# Patient Record
Sex: FEMALE | Race: WHITE | NOT HISPANIC OR LATINO | Employment: UNEMPLOYED | URBAN - NONMETROPOLITAN AREA
[De-identification: names, ages, dates, MRNs, and addresses within clinical notes are randomized per-mention and may not be internally consistent; named-entity substitution may affect disease eponyms.]

---

## 2020-06-01 ENCOUNTER — HOSPITAL ENCOUNTER (EMERGENCY)
Facility: HOSPITAL | Age: 85
Discharge: HOME | End: 2020-06-01
Attending: EMERGENCY MEDICINE | Admitting: EMERGENCY MEDICINE
Payer: MEDICARE

## 2020-06-01 ENCOUNTER — APPOINTMENT (EMERGENCY)
Dept: RADIOLOGY | Facility: HOSPITAL | Age: 85
End: 2020-06-01
Payer: MEDICARE

## 2020-06-01 ENCOUNTER — APPOINTMENT (EMERGENCY)
Dept: CT IMAGING | Facility: HOSPITAL | Age: 85
End: 2020-06-01
Payer: MEDICARE

## 2020-06-01 VITALS
SYSTOLIC BLOOD PRESSURE: 151 MMHG | RESPIRATION RATE: 18 BRPM | TEMPERATURE: 98.7 F | OXYGEN SATURATION: 96 % | WEIGHT: 103.4 LBS | HEART RATE: 92 BPM | DIASTOLIC BLOOD PRESSURE: 87 MMHG

## 2020-06-01 DIAGNOSIS — K83.8 DILATED CBD, ACQUIRED: ICD-10-CM

## 2020-06-01 DIAGNOSIS — R91.1 LUNG NODULE SEEN ON IMAGING STUDY: ICD-10-CM

## 2020-06-01 DIAGNOSIS — I10 ESSENTIAL HYPERTENSION: ICD-10-CM

## 2020-06-01 DIAGNOSIS — R79.89 ELEVATED D-DIMER: ICD-10-CM

## 2020-06-01 DIAGNOSIS — R10.13 EPIGASTRIC ABDOMINAL PAIN: Primary | ICD-10-CM

## 2020-06-01 LAB
ALBUMIN SERPL BCP-MCNC: 3.9 G/DL (ref 3.5–5)
ALP SERPL-CCNC: 77 U/L (ref 46–116)
ALT SERPL W P-5'-P-CCNC: 17 U/L (ref 12–78)
ANION GAP SERPL CALCULATED.3IONS-SCNC: 11 MMOL/L (ref 4–13)
AST SERPL W P-5'-P-CCNC: 22 U/L (ref 5–45)
ATRIAL RATE: 86 BPM
BACTERIA UR QL AUTO: ABNORMAL /HPF
BASOPHILS # BLD AUTO: 0.02 THOUSANDS/ΜL (ref 0–0.1)
BASOPHILS NFR BLD AUTO: 0 % (ref 0–1)
BILIRUB SERPL-MCNC: 0.49 MG/DL (ref 0.2–1)
BILIRUB UR QL STRIP: NEGATIVE
BUN SERPL-MCNC: 15 MG/DL (ref 5–25)
CALCIUM SERPL-MCNC: 9.4 MG/DL (ref 8.3–10.1)
CHLORIDE SERPL-SCNC: 104 MMOL/L (ref 100–108)
CK SERPL-CCNC: 83 U/L (ref 26–192)
CLARITY UR: CLEAR
CO2 SERPL-SCNC: 27 MMOL/L (ref 21–32)
COLOR UR: YELLOW
CREAT SERPL-MCNC: 1.05 MG/DL (ref 0.6–1.3)
D DIMER PPP FEU-MCNC: 1.51 UG/ML FEU
EOSINOPHIL # BLD AUTO: 0.04 THOUSAND/ΜL (ref 0–0.61)
EOSINOPHIL NFR BLD AUTO: 1 % (ref 0–6)
ERYTHROCYTE [DISTWIDTH] IN BLOOD BY AUTOMATED COUNT: 13.1 % (ref 11.6–15.1)
GFR SERPL CREATININE-BSD FRML MDRD: 46 ML/MIN/1.73SQ M
GLUCOSE SERPL-MCNC: 101 MG/DL (ref 65–140)
GLUCOSE UR STRIP-MCNC: NEGATIVE MG/DL
HCT VFR BLD AUTO: 43.6 % (ref 34.8–46.1)
HGB BLD-MCNC: 13.6 G/DL (ref 11.5–15.4)
HGB UR QL STRIP.AUTO: ABNORMAL
HYALINE CASTS #/AREA URNS LPF: ABNORMAL /LPF
IMM GRANULOCYTES # BLD AUTO: 0.04 THOUSAND/UL (ref 0–0.2)
IMM GRANULOCYTES NFR BLD AUTO: 1 % (ref 0–2)
INR PPP: 0.92 (ref 0.84–1.19)
KETONES UR STRIP-MCNC: NEGATIVE MG/DL
LEUKOCYTE ESTERASE UR QL STRIP: NEGATIVE
LIPASE SERPL-CCNC: 147 U/L (ref 73–393)
LYMPHOCYTES # BLD AUTO: 1.14 THOUSANDS/ΜL (ref 0.6–4.47)
LYMPHOCYTES NFR BLD AUTO: 16 % (ref 14–44)
MAGNESIUM SERPL-MCNC: 2 MG/DL (ref 1.6–2.6)
MCH RBC QN AUTO: 31.3 PG (ref 26.8–34.3)
MCHC RBC AUTO-ENTMCNC: 31.2 G/DL (ref 31.4–37.4)
MCV RBC AUTO: 101 FL (ref 82–98)
MONOCYTES # BLD AUTO: 0.49 THOUSAND/ΜL (ref 0.17–1.22)
MONOCYTES NFR BLD AUTO: 7 % (ref 4–12)
NEUTROPHILS # BLD AUTO: 5.59 THOUSANDS/ΜL (ref 1.85–7.62)
NEUTS SEG NFR BLD AUTO: 75 % (ref 43–75)
NITRITE UR QL STRIP: NEGATIVE
NON-SQ EPI CELLS URNS QL MICRO: ABNORMAL /HPF
NRBC BLD AUTO-RTO: 0 /100 WBCS
P AXIS: 87 DEGREES
PH UR STRIP.AUTO: 6 [PH]
PLATELET # BLD AUTO: 174 THOUSANDS/UL (ref 149–390)
PMV BLD AUTO: 10.1 FL (ref 8.9–12.7)
POTASSIUM SERPL-SCNC: 4.3 MMOL/L (ref 3.5–5.3)
PR INTERVAL: 150 MS
PROT SERPL-MCNC: 8.3 G/DL (ref 6.4–8.2)
PROT UR STRIP-MCNC: NEGATIVE MG/DL
PROTHROMBIN TIME: 12.4 SECONDS (ref 11.6–14.5)
QRS AXIS: 82 DEGREES
QRSD INTERVAL: 66 MS
QT INTERVAL: 372 MS
QTC INTERVAL: 445 MS
RBC # BLD AUTO: 4.34 MILLION/UL (ref 3.81–5.12)
RBC #/AREA URNS AUTO: ABNORMAL /HPF
SODIUM SERPL-SCNC: 142 MMOL/L (ref 136–145)
SP GR UR STRIP.AUTO: 1.01 (ref 1–1.03)
T WAVE AXIS: 74 DEGREES
TROPONIN I SERPL-MCNC: <0.02 NG/ML
TROPONIN I SERPL-MCNC: <0.02 NG/ML
UROBILINOGEN UR QL STRIP.AUTO: 0.2 E.U./DL
VENTRICULAR RATE: 86 BPM
WBC # BLD AUTO: 7.32 THOUSAND/UL (ref 4.31–10.16)
WBC #/AREA URNS AUTO: ABNORMAL /HPF

## 2020-06-01 PROCEDURE — 96374 THER/PROPH/DIAG INJ IV PUSH: CPT

## 2020-06-01 PROCEDURE — 74175 CTA ABDOMEN W/CONTRAST: CPT

## 2020-06-01 PROCEDURE — 96361 HYDRATE IV INFUSION ADD-ON: CPT

## 2020-06-01 PROCEDURE — 93005 ELECTROCARDIOGRAM TRACING: CPT

## 2020-06-01 PROCEDURE — 80053 COMPREHEN METABOLIC PANEL: CPT | Performed by: EMERGENCY MEDICINE

## 2020-06-01 PROCEDURE — 96376 TX/PRO/DX INJ SAME DRUG ADON: CPT

## 2020-06-01 PROCEDURE — 85379 FIBRIN DEGRADATION QUANT: CPT | Performed by: EMERGENCY MEDICINE

## 2020-06-01 PROCEDURE — 96375 TX/PRO/DX INJ NEW DRUG ADDON: CPT

## 2020-06-01 PROCEDURE — 71275 CT ANGIOGRAPHY CHEST: CPT

## 2020-06-01 PROCEDURE — 83690 ASSAY OF LIPASE: CPT | Performed by: EMERGENCY MEDICINE

## 2020-06-01 PROCEDURE — 70450 CT HEAD/BRAIN W/O DYE: CPT

## 2020-06-01 PROCEDURE — 83735 ASSAY OF MAGNESIUM: CPT | Performed by: EMERGENCY MEDICINE

## 2020-06-01 PROCEDURE — 82550 ASSAY OF CK (CPK): CPT | Performed by: EMERGENCY MEDICINE

## 2020-06-01 PROCEDURE — 36415 COLL VENOUS BLD VENIPUNCTURE: CPT | Performed by: EMERGENCY MEDICINE

## 2020-06-01 PROCEDURE — 84484 ASSAY OF TROPONIN QUANT: CPT | Performed by: EMERGENCY MEDICINE

## 2020-06-01 PROCEDURE — 81001 URINALYSIS AUTO W/SCOPE: CPT | Performed by: EMERGENCY MEDICINE

## 2020-06-01 PROCEDURE — 85610 PROTHROMBIN TIME: CPT | Performed by: EMERGENCY MEDICINE

## 2020-06-01 PROCEDURE — 99284 EMERGENCY DEPT VISIT MOD MDM: CPT

## 2020-06-01 PROCEDURE — 71045 X-RAY EXAM CHEST 1 VIEW: CPT

## 2020-06-01 PROCEDURE — 99285 EMERGENCY DEPT VISIT HI MDM: CPT | Performed by: EMERGENCY MEDICINE

## 2020-06-01 PROCEDURE — 85025 COMPLETE CBC W/AUTO DIFF WBC: CPT | Performed by: EMERGENCY MEDICINE

## 2020-06-01 RX ORDER — DICYCLOMINE HCL 20 MG
20 TABLET ORAL 2 TIMES DAILY
Qty: 20 TABLET | Refills: 0 | Status: SHIPPED | OUTPATIENT
Start: 2020-06-01

## 2020-06-01 RX ORDER — LABETALOL 20 MG/4 ML (5 MG/ML) INTRAVENOUS SYRINGE
10 ONCE
Status: COMPLETED | OUTPATIENT
Start: 2020-06-01 | End: 2020-06-01

## 2020-06-01 RX ORDER — SODIUM CHLORIDE 9 MG/ML
3 INJECTION INTRAVENOUS
Status: DISCONTINUED | OUTPATIENT
Start: 2020-06-01 | End: 2020-06-01 | Stop reason: HOSPADM

## 2020-06-01 RX ORDER — HYDRALAZINE HYDROCHLORIDE 20 MG/ML
10 INJECTION INTRAMUSCULAR; INTRAVENOUS ONCE
Status: COMPLETED | OUTPATIENT
Start: 2020-06-01 | End: 2020-06-01

## 2020-06-01 RX ORDER — FAMOTIDINE 20 MG/1
20 TABLET, FILM COATED ORAL 2 TIMES DAILY
Qty: 30 TABLET | Refills: 0 | Status: SHIPPED | OUTPATIENT
Start: 2020-06-01

## 2020-06-01 RX ADMIN — HYDRALAZINE HYDROCHLORIDE 10 MG: 20 INJECTION INTRAMUSCULAR; INTRAVENOUS at 16:18

## 2020-06-01 RX ADMIN — SODIUM CHLORIDE 500 ML: 0.9 INJECTION, SOLUTION INTRAVENOUS at 13:30

## 2020-06-01 RX ADMIN — FAMOTIDINE 20 MG: 10 INJECTION, SOLUTION INTRAVENOUS at 13:31

## 2020-06-01 RX ADMIN — LABETALOL 20 MG/4 ML (5 MG/ML) INTRAVENOUS SYRINGE 10 MG: at 13:31

## 2020-06-01 RX ADMIN — LABETALOL 20 MG/4 ML (5 MG/ML) INTRAVENOUS SYRINGE 10 MG: at 14:57

## 2020-06-01 RX ADMIN — MORPHINE SULFATE 1 MG: 2 INJECTION, SOLUTION INTRAMUSCULAR; INTRAVENOUS at 13:30

## 2020-06-01 RX ADMIN — IOHEXOL 100 ML: 350 INJECTION, SOLUTION INTRAVENOUS at 14:52

## 2020-06-04 ENCOUNTER — HOSPITAL ENCOUNTER (EMERGENCY)
Facility: HOSPITAL | Age: 85
Discharge: HOME | End: 2020-06-04
Attending: EMERGENCY MEDICINE | Admitting: EMERGENCY MEDICINE
Payer: MEDICARE

## 2020-06-04 ENCOUNTER — APPOINTMENT (EMERGENCY)
Dept: ULTRASOUND IMAGING | Facility: HOSPITAL | Age: 85
End: 2020-06-04
Payer: MEDICARE

## 2020-06-04 VITALS
RESPIRATION RATE: 20 BRPM | HEART RATE: 75 BPM | TEMPERATURE: 98.4 F | WEIGHT: 105.82 LBS | SYSTOLIC BLOOD PRESSURE: 132 MMHG | DIASTOLIC BLOOD PRESSURE: 63 MMHG | OXYGEN SATURATION: 95 %

## 2020-06-04 DIAGNOSIS — M54.9 BACK PAIN: Primary | ICD-10-CM

## 2020-06-04 DIAGNOSIS — R10.13 EPIGASTRIC PAIN: ICD-10-CM

## 2020-06-04 LAB
ALBUMIN SERPL BCP-MCNC: 3.8 G/DL (ref 3.5–5)
ALP SERPL-CCNC: 73 U/L (ref 46–116)
ALT SERPL W P-5'-P-CCNC: 22 U/L (ref 12–78)
ANION GAP SERPL CALCULATED.3IONS-SCNC: 7 MMOL/L (ref 4–13)
AST SERPL W P-5'-P-CCNC: 29 U/L (ref 5–45)
ATRIAL RATE: 85 BPM
BASOPHILS # BLD AUTO: 0.02 THOUSANDS/ΜL (ref 0–0.1)
BASOPHILS NFR BLD AUTO: 0 % (ref 0–1)
BILIRUB SERPL-MCNC: 0.58 MG/DL (ref 0.2–1)
BUN SERPL-MCNC: 14 MG/DL (ref 5–25)
CALCIUM SERPL-MCNC: 8.9 MG/DL (ref 8.3–10.1)
CHLORIDE SERPL-SCNC: 105 MMOL/L (ref 100–108)
CO2 SERPL-SCNC: 31 MMOL/L (ref 21–32)
CREAT SERPL-MCNC: 1 MG/DL (ref 0.6–1.3)
EOSINOPHIL # BLD AUTO: 0.1 THOUSAND/ΜL (ref 0–0.61)
EOSINOPHIL NFR BLD AUTO: 1 % (ref 0–6)
ERYTHROCYTE [DISTWIDTH] IN BLOOD BY AUTOMATED COUNT: 13.2 % (ref 11.6–15.1)
GFR SERPL CREATININE-BSD FRML MDRD: 49 ML/MIN/1.73SQ M
GLUCOSE SERPL-MCNC: 102 MG/DL (ref 65–140)
HCT VFR BLD AUTO: 44.1 % (ref 34.8–46.1)
HGB BLD-MCNC: 13.8 G/DL (ref 11.5–15.4)
IMM GRANULOCYTES # BLD AUTO: 0.04 THOUSAND/UL (ref 0–0.2)
IMM GRANULOCYTES NFR BLD AUTO: 1 % (ref 0–2)
LACTATE SERPL-SCNC: 1.4 MMOL/L (ref 0.5–2)
LIPASE SERPL-CCNC: 185 U/L (ref 73–393)
LYMPHOCYTES # BLD AUTO: 0.99 THOUSANDS/ΜL (ref 0.6–4.47)
LYMPHOCYTES NFR BLD AUTO: 14 % (ref 14–44)
MCH RBC QN AUTO: 31.2 PG (ref 26.8–34.3)
MCHC RBC AUTO-ENTMCNC: 31.3 G/DL (ref 31.4–37.4)
MCV RBC AUTO: 100 FL (ref 82–98)
MONOCYTES # BLD AUTO: 0.57 THOUSAND/ΜL (ref 0.17–1.22)
MONOCYTES NFR BLD AUTO: 8 % (ref 4–12)
NEUTROPHILS # BLD AUTO: 5.34 THOUSANDS/ΜL (ref 1.85–7.62)
NEUTS SEG NFR BLD AUTO: 76 % (ref 43–75)
NRBC BLD AUTO-RTO: 0 /100 WBCS
P AXIS: 95 DEGREES
PLATELET # BLD AUTO: 183 THOUSANDS/UL (ref 149–390)
PMV BLD AUTO: 10.2 FL (ref 8.9–12.7)
POTASSIUM SERPL-SCNC: 4.1 MMOL/L (ref 3.5–5.3)
PR INTERVAL: 148 MS
PROT SERPL-MCNC: 8.4 G/DL (ref 6.4–8.2)
QRS AXIS: 94 DEGREES
QRSD INTERVAL: 60 MS
QT INTERVAL: 364 MS
QTC INTERVAL: 433 MS
RBC # BLD AUTO: 4.42 MILLION/UL (ref 3.81–5.12)
SODIUM SERPL-SCNC: 143 MMOL/L (ref 136–145)
T WAVE AXIS: 85 DEGREES
TROPONIN I SERPL-MCNC: <0.02 NG/ML
VENTRICULAR RATE: 85 BPM
WBC # BLD AUTO: 7.06 THOUSAND/UL (ref 4.31–10.16)

## 2020-06-04 PROCEDURE — 99285 EMERGENCY DEPT VISIT HI MDM: CPT | Performed by: PHYSICIAN ASSISTANT

## 2020-06-04 PROCEDURE — 36415 COLL VENOUS BLD VENIPUNCTURE: CPT | Performed by: PHYSICIAN ASSISTANT

## 2020-06-04 PROCEDURE — 96375 TX/PRO/DX INJ NEW DRUG ADDON: CPT

## 2020-06-04 PROCEDURE — 96374 THER/PROPH/DIAG INJ IV PUSH: CPT

## 2020-06-04 PROCEDURE — 93005 ELECTROCARDIOGRAM TRACING: CPT

## 2020-06-04 PROCEDURE — 76705 ECHO EXAM OF ABDOMEN: CPT

## 2020-06-04 PROCEDURE — 85025 COMPLETE CBC W/AUTO DIFF WBC: CPT | Performed by: PHYSICIAN ASSISTANT

## 2020-06-04 PROCEDURE — 83690 ASSAY OF LIPASE: CPT | Performed by: PHYSICIAN ASSISTANT

## 2020-06-04 PROCEDURE — 84484 ASSAY OF TROPONIN QUANT: CPT | Performed by: PHYSICIAN ASSISTANT

## 2020-06-04 PROCEDURE — 83605 ASSAY OF LACTIC ACID: CPT | Performed by: PHYSICIAN ASSISTANT

## 2020-06-04 PROCEDURE — 99284 EMERGENCY DEPT VISIT MOD MDM: CPT

## 2020-06-04 PROCEDURE — 80053 COMPREHEN METABOLIC PANEL: CPT | Performed by: PHYSICIAN ASSISTANT

## 2020-06-04 RX ORDER — LABETALOL 20 MG/4 ML (5 MG/ML) INTRAVENOUS SYRINGE
10 ONCE
Status: COMPLETED | OUTPATIENT
Start: 2020-06-04 | End: 2020-06-04

## 2020-06-04 RX ORDER — CLONIDINE HYDROCHLORIDE 0.1 MG/1
0.2 TABLET ORAL ONCE
Status: COMPLETED | OUTPATIENT
Start: 2020-06-04 | End: 2020-06-04

## 2020-06-04 RX ADMIN — CLONIDINE HYDROCHLORIDE 0.2 MG: 0.1 TABLET ORAL at 10:49

## 2020-06-04 RX ADMIN — FAMOTIDINE 20 MG: 10 INJECTION, SOLUTION INTRAVENOUS at 11:31

## 2020-06-04 RX ADMIN — LABETALOL 20 MG/4 ML (5 MG/ML) INTRAVENOUS SYRINGE 10 MG: at 10:50

## 2022-08-24 ENCOUNTER — OFFICE VISIT (OUTPATIENT)
Dept: URGENT CARE | Facility: CLINIC | Age: 87
End: 2022-08-24
Payer: MEDICARE

## 2022-08-24 ENCOUNTER — APPOINTMENT (OUTPATIENT)
Dept: RADIOLOGY | Facility: CLINIC | Age: 87
End: 2022-08-24
Payer: MEDICARE

## 2022-08-24 VITALS
WEIGHT: 91 LBS | RESPIRATION RATE: 18 BRPM | DIASTOLIC BLOOD PRESSURE: 89 MMHG | TEMPERATURE: 98 F | BODY MASS INDEX: 15.54 KG/M2 | HEART RATE: 83 BPM | HEIGHT: 64 IN | SYSTOLIC BLOOD PRESSURE: 142 MMHG | OXYGEN SATURATION: 94 %

## 2022-08-24 DIAGNOSIS — S20.212A RIB CONTUSION, LEFT, INITIAL ENCOUNTER: ICD-10-CM

## 2022-08-24 DIAGNOSIS — S22.32XA CLOSED FRACTURE OF ONE RIB OF LEFT SIDE, INITIAL ENCOUNTER: Primary | ICD-10-CM

## 2022-08-24 PROBLEM — F41.9 ANXIETY: Status: ACTIVE | Noted: 2022-08-24

## 2022-08-24 PROBLEM — M25.50 POLYARTHRALGIA: Status: ACTIVE | Noted: 2021-06-15

## 2022-08-24 PROBLEM — I10 PRIMARY HYPERTENSION: Status: ACTIVE | Noted: 2021-11-02

## 2022-08-24 PROCEDURE — 99203 OFFICE O/P NEW LOW 30 MIN: CPT | Performed by: PHYSICIAN ASSISTANT

## 2022-08-24 PROCEDURE — G0463 HOSPITAL OUTPT CLINIC VISIT: HCPCS | Performed by: PHYSICIAN ASSISTANT

## 2022-08-24 PROCEDURE — 71101 X-RAY EXAM UNILAT RIBS/CHEST: CPT

## 2022-08-24 RX ORDER — ASPIRIN 81 MG/1
81 TABLET ORAL
COMMUNITY

## 2022-08-24 RX ORDER — SERTRALINE HYDROCHLORIDE 25 MG/1
TABLET, FILM COATED ORAL
COMMUNITY
Start: 2022-08-02

## 2022-08-24 NOTE — PATIENT INSTRUCTIONS
Head Injury   WHAT YOU NEED TO KNOW:   A head injury can include your scalp, face, skull, or brain and range from mild to severe  Effects can appear immediately after the injury or develop later  The effects may last a short time or be permanent  Healthcare providers may want to check your recovery over time  Treatment may change as you recover or develop new health problems from the head injury  DISCHARGE INSTRUCTIONS:   Call your local emergency number (911 in the [place]), or have someone else call if:   You cannot be woken  You have a seizure  You stop responding to others or you faint  You have blurry or double vision  Your speech becomes slurred or confused  You have arm or leg weakness, loss of feeling, or new problems with coordination  Your pupils are larger than usual, or one pupil is a different size than the other  You have blood or clear fluid coming out of your ears or nose  Return to the emergency department if:   You have repeated or forceful vomiting  You feel confused  Your headache gets worse or becomes severe  You or someone caring for you notices that you are harder to wake than usual     Call your doctor if:   Your symptoms last longer than 6 weeks after the injury  You have questions or concerns about your condition or care  Medicines:   Acetaminophen  decreases pain and fever  It is available without a doctor's order  Ask how much to take and how often to take it  Follow directions  Read the labels of all other medicines you are using to see if they also contain acetaminophen, or ask your doctor or pharmacist  Acetaminophen can cause liver damage if not taken correctly  Do not use more than 4 grams (4,000 milligrams) total of acetaminophen in one day  Take your medicine as directed  Contact your healthcare provider if you think your medicine is not helping or if you have side effects  Tell him or her if you are allergic to any medicine   Keep a list of the medicines, vitamins, and herbs you take  Include the amounts, and when and why you take them  Bring the list or the pill bottles to follow-up visits  Carry your medicine list with you in case of an emergency  Self-care:   Rest  or do quiet activities  Limit your time watching TV, using the computer, or doing tasks that require a lot of thinking  Slowly return to your normal activities as directed  Do not play sports or do activities that may cause you to get hit in the head  Ask your healthcare provider when you can return to sports  Apply ice  on your head for 15 to 20 minutes every hour or as directed  Use an ice pack, or put crushed ice in a plastic bag  Cover it with a towel before you apply it to your skin  Ice helps prevent tissue damage and decreases swelling and pain  Have someone stay with you for 24 hours  , or as directed  This person can monitor you for problems and call for help if needed  When you are awake, the person should ask you a few questions every few hours to see if you are thinking clearly  An example is to ask your name or address  Prevent another head injury:   Wear a helmet that fits properly  Do this when you play sports, or ride a bike, scooter, or skateboard  Helmets help decrease your risk for a serious head injury  Talk to your healthcare provider about other ways you can protect yourself if you play sports  Wear your seatbelt every time you are in a car  This helps lower your risk for a head injury if you are in a car accident  Follow up with your doctor as directed:  Write down your questions so you remember to ask them during your visits  © Copyright [employer] 2022 Information is for End User's use only and may not be sold, redistributed or otherwise used for commercial purposes  All illustrations and images included in CareNotes® are the copyrighted property of A [employer] M [employer] Inc  or Monica Fraser  The above information is an  only   It is not intended as medical advice for individual conditions or treatments  Talk to your doctor, nurse or pharmacist before following any medical regimen to see if it is safe and effective for you  Rib Fracture   WHAT YOU NEED TO KNOW:   A rib fracture is a crack or break in a rib bone  Rib fractures usually heal within 6 weeks  You should be able to return to your usual activities before that time  DISCHARGE INSTRUCTIONS:   Call your local emergency number (911 in the [address] [facility]) if:   You have trouble breathing  You have new or increased pain  Return to the emergency department if:   Your pain does not get better, even after treatment  You have a fever or a cough  Call your doctor if:   You have questions or concerns about your condition or care  Medicines: You may need any of the following:  NSAIDs , such as ibuprofen, help decrease swelling, pain, and fever  This medicine is available with or without a doctor's order  NSAIDs can cause stomach bleeding or kidney problems in certain people  If you take blood thinner medicine, always ask your healthcare provider if NSAIDs are safe for you  Always read the medicine label and follow directions  Prescription pain medicine  may be given  Ask your healthcare provider how to take this medicine safely  Some prescription pain medicines contain acetaminophen  Do not take other medicines that contain acetaminophen without talking to your healthcare provider  Too much acetaminophen may cause liver damage  Prescription pain medicine may cause constipation  Ask your healthcare provider how to prevent or treat constipation  Take your medicine as directed  Contact your healthcare provider if you think your medicine is not helping or if you have side effects  Tell him or her if you are allergic to any medicine  Keep a list of the medicines, vitamins, and herbs you take  Include the amounts, and when and why you take them   Bring the list or the pill bottles to follow-up visits  Carry your medicine list with you in case of an emergency  Self-care: Take deep breaths and cough 10 times each hour  This will decrease your risk for a lung infection  Hug a pillow on your injured side to decrease pain while you take deep breaths  Take a deep breath and hold it for as long as you can  Let the air out and then cough  Deep breaths help open your airway  You may be given an incentive spirometer to help you take deep breaths  Put the plastic piece in your mouth and take a slow, deep breath, then let the air out and cough  Repeat these steps 10 times every hour  Rest and limit activity as directed  Do not pull, push, or lift objects  Start to do more as your pain decreases  Ask your healthcare provider how much activity you can do  Apply ice  on your chest near your fractured rib for 15 to 20 minutes every hour or as directed  Use an ice pack, or put crushed ice in a plastic bag  Cover it with a towel  Ice helps prevent tissue damage and decreases swelling and pain  Follow up with your doctor as directed:  Write down your questions so you remember to ask them during your visits  © Copyright [employer] 2022 Information is for End User's use only and may not be sold, redistributed or otherwise used for commercial purposes  All illustrations and images included in CareNotes® are the copyrighted property of [employer]  or Monica Fraser  The above information is an  only  It is not intended as medical advice for individual conditions or treatments  Talk to your doctor, nurse or pharmacist before following any medical regimen to see if it is safe and effective for you

## 2022-08-24 NOTE — PROGRESS NOTES
[facility]        NAME: Nandini Bazan is a 80 y o  female  : 3/31/1928    MRN: [id number]  DATE: 2022  TIME: 12:25 PM    Assessment and Plan   Closed fracture of one rib of left side, initial encounter [S22 32XA]  1  Closed fracture of one rib of left side, initial encounter     2  Rib contusion, left, initial encounter  XR ribs left w pa chest min 3 views         Patient Instructions   Patient Instructions     Head Injury   WHAT YOU NEED TO KNOW:   A head injury can include your scalp, face, skull, or brain and range from mild to severe  Effects can appear immediately after the injury or develop later  The effects may last a short time or be permanent  Healthcare providers may want to check your recovery over time  Treatment may change as you recover or develop new health problems from the head injury  DISCHARGE INSTRUCTIONS:   Call your local emergency number (911 in the [address] [place] [address],[address]), or have someone else call if:   · You cannot be woken  · You have a seizure  · You stop responding to others or you faint  · You have blurry or double vision  · Your speech becomes slurred or confused  · You have arm or leg weakness, loss of feeling, or new problems with coordination  · Your pupils are larger than usual, or one pupil is a different size than the other  · You have blood or clear fluid coming out of your ears or nose  Return to the emergency department if:   · You have repeated or forceful vomiting  · You feel confused  · Your headache gets worse or becomes severe  · You or someone caring for you notices that you are harder to wake than usual     Call your doctor if:   · Your symptoms last longer than 6 weeks after the injury  · You have questions or concerns about your condition or care  Medicines:   · Acetaminophen  decreases pain and fever  It is available without a doctor's order  Ask how much to take and how often to take it  Follow directions   Read the labels of all other medicines you are using to see if they also contain acetaminophen, or ask your doctor or pharmacist  Acetaminophen can cause liver damage if not taken correctly  Do not use more than 4 grams (4,000 milligrams) total of acetaminophen in one day  · Take your medicine as directed  Contact your healthcare provider if you think your medicine is not helping or if you have side effects  Tell him or her if you are allergic to any medicine  Keep a list of the medicines, vitamins, and herbs you take  Include the amounts, and when and why you take them  Bring the list or the pill bottles to follow-up visits  Carry your medicine list with you in case of an emergency  Self-care:   · Rest  or do quiet activities  Limit your time watching TV, using the computer, or doing tasks that require a lot of thinking  Slowly return to your normal activities as directed  Do not play sports or do activities that may cause you to get hit in the head  Ask your healthcare provider when you can return to sports  · Apply ice  on your head for 15 to 20 minutes every hour or as directed  Use an ice pack, or put crushed ice in a plastic bag  Cover it with a towel before you apply it to your skin  Ice helps prevent tissue damage and decreases swelling and pain  · Have someone stay with you for 24 hours  , or as directed  This person can monitor you for problems and call for help if needed  When you are awake, the person should ask you a few questions every few hours to see if you are thinking clearly  An example is to ask your name or address  Prevent another head injury:   · Wear a helmet that fits properly  Do this when you play sports, or ride a bike, scooter, or skateboard  Helmets help decrease your risk for a serious head injury  Talk to your healthcare provider about other ways you can protect yourself if you play sports  · Wear your seatbelt every time you are in a car    This helps lower your risk for a head injury if you are in a car accident  Follow up with your doctor as directed:  Write down your questions so you remember to ask them during your visits  © Copyright [employer] 2022 Information is for End User's use only and may not be sold, redistributed or otherwise used for commercial purposes  All illustrations and images included in CareNotes® are the copyrighted property of A [employer] M [employer] Inc  or Monica Tsai   The above information is an  only  It is not intended as medical advice for individual conditions or treatments  Talk to your doctor, nurse or pharmacist before following any medical regimen to see if it is safe and effective for you  Rib Fracture   WHAT YOU NEED TO KNOW:   A rib fracture is a crack or break in a rib bone  Rib fractures usually heal within 6 weeks  You should be able to return to your usual activities before that time  DISCHARGE INSTRUCTIONS:   Call your local emergency number (911 in the [address] [facility]) if:   · You have trouble breathing  · You have new or increased pain  Return to the emergency department if:   · Your pain does not get better, even after treatment  · You have a fever or a cough  Call your doctor if:   · You have questions or concerns about your condition or care  Medicines: You may need any of the following:  · NSAIDs , such as ibuprofen, help decrease swelling, pain, and fever  This medicine is available with or without a doctor's order  NSAIDs can cause stomach bleeding or kidney problems in certain people  If you take blood thinner medicine, always ask your healthcare provider if NSAIDs are safe for you  Always read the medicine label and follow directions  · Prescription pain medicine  may be given  Ask your healthcare provider how to take this medicine safely  Some prescription pain medicines contain acetaminophen  Do not take other medicines that contain acetaminophen without talking to your healthcare provider   Too much acetaminophen may cause liver damage  Prescription pain medicine may cause constipation  Ask your healthcare provider how to prevent or treat constipation  · Take your medicine as directed  Contact your healthcare provider if you think your medicine is not helping or if you have side effects  Tell him or her if you are allergic to any medicine  Keep a list of the medicines, vitamins, and herbs you take  Include the amounts, and when and why you take them  Bring the list or the pill bottles to follow-up visits  Carry your medicine list with you in case of an emergency  Self-care:   · Take deep breaths and cough 10 times each hour  This will decrease your risk for a lung infection  Hug a pillow on your injured side to decrease pain while you take deep breaths  Take a deep breath and hold it for as long as you can  Let the air out and then cough  Deep breaths help open your airway  You may be given an incentive spirometer to help you take deep breaths  Put the plastic piece in your mouth and take a slow, deep breath, then let the air out and cough  Repeat these steps 10 times every hour  · Rest and limit activity as directed  Do not pull, push, or lift objects  Start to do more as your pain decreases  Ask your healthcare provider how much activity you can do  · Apply ice  on your chest near your fractured rib for 15 to 20 minutes every hour or as directed  Use an ice pack, or put crushed ice in a plastic bag  Cover it with a towel  Ice helps prevent tissue damage and decreases swelling and pain  Follow up with your doctor as directed:  Write down your questions so you remember to ask them during your visits  © Copyright [employer] 2022 Information is for End User's use only and may not be sold, redistributed or otherwise used for commercial purposes   All illustrations and images included in CareNotes® are the copyrighted property of A [employer]  or Monica Fraser  The above information is an  only  It is not intended as medical advice for individual conditions or treatments  Talk to your doctor, nurse or pharmacist before following any medical regimen to see if it is safe and effective for you  Follow up with PCP in 3-5 days  Proceed to  ER if symptoms worsen  Chief Complaint     Chief Complaint   Patient presents with    Fall    Rib Injury         History of Present Illness       Patient is a 51-year-old female who presents to the clinic with her daughter's after a fall this morning at home  The patient lives at home by her self and was brought to the clinic by her daughters who states that she call them home today stating that she fell when she tried to get on her potty chair this morning  The patient states that she fell up against the arms of the potty chair and fell against her dresser that was near her bed  She states that a glass of water and medicine fell off the dresser on to the right side of her head  The patient may have also bumped the floor with her head but denied any loss of consciousness  The patient is complaining of a contusion on the left lateral ribs  The patient describes the pain as a sharp pain that is worse with movement, heavy breathing, and touching the area  The patient denies associated headache, dizziness, nausea, vomiting, fever, or chills parents she does have a history of slight dementia  The patient is not currently on any anticoagulants therapy but is on a baby aspirin daily  Review of Systems   Review of Systems   Constitutional: Negative for chills, diaphoresis and fatigue  Respiratory: Negative for choking and shortness of breath  Cardiovascular: Negative for chest pain and leg swelling  Gastrointestinal: Negative for abdominal distention and abdominal pain  Musculoskeletal: Negative for arthralgias     Skin:        Abrasion of the lateral ribs as noted above   Neurological: Negative for dizziness, tremors, seizures, syncope, speech difficulty, weakness, numbness and headaches  Current Medications       Current Outpatient Medications:     aspirin (ECOTRIN LOW STRENGTH) 81 mg EC tablet, Take 81 mg by mouth, Disp: , Rfl:     dicyclomine (BENTYL) 20 mg tablet, Take 1 tablet (20 mg total) by mouth 2 (two) times a day, Disp: 20 tablet, Rfl: 0    famotidine (PEPCID) 20 mg tablet, Take 1 tablet (20 mg total) by mouth 2 (two) times a day, Disp: 30 tablet, Rfl: 0    sertraline (ZOLOFT) 25 mg tablet, , Disp: , Rfl:     Current Allergies     Allergies as of 08/24/2022 - Reviewed 08/24/2022   Allergen Reaction Noted    Prednisone Other (See Comments) 06/01/2020    Azithromycin Rash 06/01/2020            The following portions of the patient's history were reviewed and updated as appropriate: allergies, current medications, past family history, past medical history, past social history, past surgical history and problem list      Past Medical History:   Diagnosis Date    Anxiety     Arthritis     Biceps tendinitis     right shoulder    Diverticulosis     GERD (gastroesophageal reflux disease)     Hypertension     IBS (irritable bowel syndrome)     Osteoporosis     Venous insufficiency        Past Surgical History:   Procedure Laterality Date    APPENDECTOMY      ARTHROSCOPY KNEE Right     CATARACT EXTRACTION, BILATERAL      FRACTURE SURGERY Left     wrist    FRACTURE SURGERY Left     hip    HYSTERECTOMY         History reviewed  No pertinent family history  Medications have been verified  Objective   /89   Pulse 83   Temp 98 °F (36 7 °C)   Resp 18   Ht 5' 4" (1 626 m)   Wt 41 3 kg (91 lb)   LMP  (LMP Unknown)   SpO2 94%   BMI 15 62 kg/m²        Physical Exam     Physical Exam  HENT:      Right Ear: No middle ear effusion  No hemotympanum  Tympanic membrane is not injected  Left Ear:  No middle ear effusion  No hemotympanum  Tympanic membrane is not injected  Chest:      Chest wall: Tenderness and edema present  Comments: -there are multiple contusions noted on the left lateral ribs  There is tenderness noted on the 8th, 9th,, 10th, 11th, and 12th anterior and lateral ribs  Neurological:      Mental Status: She is alert  -x-ray reviewed  There appears to be a displaced 5th rib fracture  She does have a follow-up with her primary care doctor today at 4:00 p m  Her daughter will discuss pain relief with the primary care doctor  I did warn of using pain meds in elderly as it increases the risk of fall  I provided a incentive spirometer and instructed the patient to use this daily to prevent infection  She has no focal neurological deficit noted on exam to indicate ER at this time, however she will need imaging of her brain if she has any change in neurological status

## 2024-09-21 ENCOUNTER — HOSPITAL ENCOUNTER (EMERGENCY)
Facility: HOSPITAL | Age: 89
Discharge: HOME | End: 2024-09-21
Payer: MEDICARE

## 2024-09-21 ENCOUNTER — APPOINTMENT (EMERGENCY)
Dept: CT IMAGING | Facility: HOSPITAL | Age: 89
End: 2024-09-21
Payer: MEDICARE

## 2024-09-21 ENCOUNTER — APPOINTMENT (EMERGENCY)
Dept: RADIOLOGY | Facility: HOSPITAL | Age: 89
End: 2024-09-21
Payer: MEDICARE

## 2024-09-21 VITALS
SYSTOLIC BLOOD PRESSURE: 159 MMHG | HEART RATE: 70 BPM | RESPIRATION RATE: 16 BRPM | TEMPERATURE: 97.4 F | DIASTOLIC BLOOD PRESSURE: 79 MMHG | OXYGEN SATURATION: 95 %
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
## 2024-09-21 DIAGNOSIS — T14.8XXA ABRASION: ICD-10-CM

## 2024-09-21 DIAGNOSIS — S09.90XA INJURY OF HEAD, INITIAL ENCOUNTER: ICD-10-CM

## 2024-09-21 LAB
2HR DELTA HS TROPONIN: 8 NG/L
4HR DELTA HS TROPONIN: 11 NG/L
ANION GAP SERPL CALCULATED.3IONS-SCNC: 4 MMOL/L (ref 4–13)
BASOPHILS # BLD AUTO: 0.03 THOUSANDS/ΜL (ref 0–0.1)
BASOPHILS NFR BLD AUTO: 0 % (ref 0–1)
BUN SERPL-MCNC: 19 MG/DL (ref 5–25)
CALCIUM SERPL-MCNC: 10.6 MG/DL (ref 8.4–10.2)
CARDIAC TROPONIN I PNL SERPL HS: 16 NG/L
CARDIAC TROPONIN I PNL SERPL HS: 24 NG/L
CARDIAC TROPONIN I PNL SERPL HS: 27 NG/L
CHLORIDE SERPL-SCNC: 103 MMOL/L (ref 96–108)
CO2 SERPL-SCNC: 32 MMOL/L (ref 21–32)
CREAT SERPL-MCNC: 1.14 MG/DL (ref 0.6–1.3)
EOSINOPHIL # BLD AUTO: 0.06 THOUSAND/ΜL (ref 0–0.61)
EOSINOPHIL NFR BLD AUTO: 1 % (ref 0–6)
ERYTHROCYTE [DISTWIDTH] IN BLOOD BY AUTOMATED COUNT: 13.4 % (ref 11.6–15.1)
GFR SERPL CREATININE-BSD FRML MDRD: 40 ML/MIN/1.73SQ M
GLUCOSE SERPL-MCNC: 78 MG/DL (ref 65–140)
HCT VFR BLD AUTO: 43.9 % (ref 34.8–46.1)
HGB BLD-MCNC: 13.7 G/DL (ref 11.5–15.4)
IMM GRANULOCYTES # BLD AUTO: 0.08 THOUSAND/UL (ref 0–0.2)
IMM GRANULOCYTES NFR BLD AUTO: 1 % (ref 0–2)
LYMPHOCYTES # BLD AUTO: 1.39 THOUSANDS/ΜL (ref 0.6–4.47)
LYMPHOCYTES NFR BLD AUTO: 18 % (ref 14–44)
MCH RBC QN AUTO: 32 PG (ref 26.8–34.3)
MCHC RBC AUTO-ENTMCNC: 31.2 G/DL (ref 31.4–37.4)
MCV RBC AUTO: 103 FL (ref 82–98)
MONOCYTES # BLD AUTO: 0.49 THOUSAND/ΜL (ref 0.17–1.22)
MONOCYTES NFR BLD AUTO: 6 % (ref 4–12)
NEUTROPHILS # BLD AUTO: 5.62 THOUSANDS/ΜL (ref 1.85–7.62)
NEUTS SEG NFR BLD AUTO: 74 % (ref 43–75)
NRBC BLD AUTO-RTO: 0 /100 WBCS
PLATELET # BLD AUTO: 160 THOUSANDS/UL (ref 149–390)
PMV BLD AUTO: 10.1 FL (ref 8.9–12.7)
POTASSIUM SERPL-SCNC: 5.2 MMOL/L (ref 3.5–5.3)
RBC # BLD AUTO: 4.28 MILLION/UL (ref 3.81–5.12)
SODIUM SERPL-SCNC: 139 MMOL/L (ref 135–147)
WBC # BLD AUTO: 7.67 THOUSAND/UL (ref 4.31–10.16)

## 2024-09-21 PROCEDURE — 80048 BASIC METABOLIC PNL TOTAL CA: CPT | Performed by: PHYSICIAN ASSISTANT

## 2024-09-21 PROCEDURE — 85025 COMPLETE CBC W/AUTO DIFF WBC: CPT | Performed by: PHYSICIAN ASSISTANT

## 2024-09-21 PROCEDURE — 70450 CT HEAD/BRAIN W/O DYE: CPT

## 2024-09-21 PROCEDURE — 99285 EMERGENCY DEPT VISIT HI MDM: CPT | Performed by: PHYSICIAN ASSISTANT

## 2024-09-21 PROCEDURE — 36415 COLL VENOUS BLD VENIPUNCTURE: CPT | Performed by: PHYSICIAN ASSISTANT

## 2024-09-21 PROCEDURE — 71045 X-RAY EXAM CHEST 1 VIEW: CPT

## 2024-09-21 PROCEDURE — 72125 CT NECK SPINE W/O DYE: CPT

## 2024-09-21 PROCEDURE — 70486 CT MAXILLOFACIAL W/O DYE: CPT

## 2024-09-21 PROCEDURE — 93005 ELECTROCARDIOGRAM TRACING: CPT

## 2024-09-21 PROCEDURE — 84484 ASSAY OF TROPONIN QUANT: CPT | Performed by: PHYSICIAN ASSISTANT

## 2024-09-21 PROCEDURE — 99285 EMERGENCY DEPT VISIT HI MDM: CPT

## 2024-09-21 NOTE — ED PROVIDER NOTES
1. Fall, initial encounter    2. Injury of head, initial encounter    3. Abrasion      ED Disposition       ED Disposition   Discharge    Condition   Stable    Date/Time   Sat Sep 21, 2024  5:09 PM    Comment   Kelisa PRESTON Bowman discharge to home/self care.                   Assessment & Plan       Medical Decision Making  89-year-old female presented to the emergency department for evaluation status post fall with head strike.  Vitals and medical record reviewed.  Patient at risk for the following but not limited to intracranial bleed, fracture, contusion, abrasion, laceration, arrhythmia, electrolyte abnormality, MI.  Patient had no chest pain while in the emergency department her EKG was nonischemic, fall likely due to mechanical trip.  Her troponin did trend up however remained relatively unchanged from 2 to 4 hours.  No significant electrolyte abnormality on laboratory findings.  CT imaging was negative for acute traumatic injury.  Incidental findings were discussed with the family for appropriate follow-up.  Wounds were cleaned, bleeding resolved.  Discussed symptomatic treatment at home return precautions and follow-up and all verbalized understanding.  Patient was clinically hemodynamically stable for discharge    Problems Addressed:  Abrasion: acute illness or injury  Fall, initial encounter: acute illness or injury  Injury of head, initial encounter: acute illness or injury    Amount and/or Complexity of Data Reviewed  Labs: ordered. Decision-making details documented in ED Course.  Radiology: ordered.                ED Course as of 09/21/24 1954   Sat Sep 21, 2024   1539 Chest xray: Slightly increased opacity in the left base compared to August 2022 which could be due to atelectasis, scar, or aspiration.   1539 WBC: 7.67   1539 Basic metabolic panel(!)  Unremarkable    1539 hs TnI 0hr: 16   1601 Patient is resting comfortably.  Updated on results and findings at this time.  Family is now at bedside.    1612 Ct c spine: No cervical spine fracture or traumatic malalignment. Small cystic lesion involving the lamina of C3 mild represent intraosseous ganglion extending from the left facet. 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     Follow-up nonemergent MRI is advised to assess for any inflammatory/erosive process at this location assuming no contraindication.     1624 CT face: No evidence of facial bone fracture.   1624 CT head: No acute intracranial abnormality.   1653 Reevaluated patient.  Resting comfortably.  Updated on all results and findings.  No pain at this time.  Superficial wound on the left eyebrow closed with skin glue.   1708 Delta 2hr hsTnI: 8  Patient without chest pain.  Delta 2 hours of 8.  Do to increase will trend to 4 hours    1917 Delta 4hr hsTnI: 11   1923 Patient remains asymptomatic.  No chest pain.  Resting comfortably.  Discussed all results and findings with patient and family.  Discussed appropriate follow-up and return precautions and all verbalized understanding.  Patient is clinically and hemodynamically stable for discharge       Medications - No data to display    History of Present Illness       89 year old female presents to the ED for evaluation status post fall. Patient states she was walking into the kitchen to get something to eat when she tripped over a metal strip that transitions the two rooms together. Reports she did hit her head. No LOC. States her alert necklace went off and 911 was notified. Patient she deactivated the call but then when she noticed her head was bleeding she crawled over to the phone and called back. She reports she did need help to get up. She denies any pain in her legs or hips has ambulated since without difficulty. Denies any neck or back pain. Denies any chest pain, abdominal pain, N/V. Patient does not take any blood thinners. She has bruising and abrasion around the left eye. This area is tender to palpation however she denies any visual  changes. She denies any chest pain, palpations or SOB. States she was not dizzy prior to fall.         Review of Systems   Constitutional:  Negative for appetite change, fatigue and fever.   HENT: Negative.     Respiratory: Negative.     Cardiovascular: Negative.    Gastrointestinal: Negative.    Musculoskeletal:  Negative for back pain and neck pain.   Skin:  Positive for color change and wound.   Neurological: Negative.    All other systems reviewed and are negative.          Objective     ED Triage Vitals   Temperature Pulse Blood Pressure Respirations SpO2 Patient Position - Orthostatic VS   09/21/24 1510 09/21/24 1446 09/21/24 1446 09/21/24 1446 09/21/24 1446 09/21/24 1446   (!) 97.3 °F (36.3 °C) 91 (!) 198/94 18 95 % Lying      Temp Source Heart Rate Source BP Location FiO2 (%) Pain Score    09/21/24 1510 09/21/24 1446 09/21/24 1446 -- 09/21/24 1931    Temporal Monitor Right arm  No Pain        Physical Exam  Vitals and nursing note reviewed.   Constitutional:       General: She is not in acute distress.     Appearance: Normal appearance. She is not ill-appearing, toxic-appearing or diaphoretic.   HENT:      Head: Normocephalic. Abrasion present. No raccoon eyes.        Nose: Nose normal.   Eyes:      Extraocular Movements: Extraocular movements intact.      Conjunctiva/sclera: Conjunctivae normal.      Pupils: Pupils are equal, round, and reactive to light.   Cardiovascular:      Rate and Rhythm: Normal rate and regular rhythm.   Pulmonary:      Effort: Pulmonary effort is normal.      Breath sounds: Normal breath sounds. No stridor. No wheezing, rhonchi or rales.   Chest:      Chest wall: No tenderness.   Abdominal:      General: Bowel sounds are normal. There is no distension.      Palpations: Abdomen is soft.      Tenderness: There is no abdominal tenderness.   Musculoskeletal:         General: No swelling or tenderness. Normal range of motion.      Cervical back: Normal range of motion. No tenderness.    Skin:     General: Skin is warm and dry.      Findings: No bruising, erythema or rash.   Neurological:      General: No focal deficit present.      Mental Status: She is alert and oriented to person, place, and time.         Labs Reviewed   CBC AND DIFFERENTIAL - Abnormal       Result Value    WBC 7.67      RBC 4.28      Hemoglobin 13.7      Hematocrit 43.9       (*)     MCH 32.0      MCHC 31.2 (*)     RDW 13.4      MPV 10.1      Platelets 160      nRBC 0      Segmented % 74      Immature Grans % 1      Lymphocytes % 18      Monocytes % 6      Eosinophils Relative 1      Basophils Relative 0      Absolute Neutrophils 5.62      Absolute Immature Grans 0.08      Absolute Lymphocytes 1.39      Absolute Monocytes 0.49      Eosinophils Absolute 0.06      Basophils Absolute 0.03     BASIC METABOLIC PANEL - Abnormal    Sodium 139      Potassium 5.2      Chloride 103      CO2 32      ANION GAP 4      BUN 19      Creatinine 1.14      Glucose 78      Calcium 10.6 (*)     eGFR 40      Narrative:     [employer] guidelines for Chronic Kidney Disease (CKD):     Stage 1 with normal or high GFR (GFR > 90 mL/min/1.73 square meters)    Stage 2 Mild CKD (GFR = 60-89 mL/min/1.73 square meters)    Stage 3A Moderate CKD (GFR = 45-59 mL/min/1.73 square meters)    Stage 3B Moderate CKD (GFR = 30-44 mL/min/1.73 square meters)    Stage 4 Severe CKD (GFR = 15-29 mL/min/1.73 square meters)    Stage 5 End Stage CKD (GFR <15 mL/min/1.73 square meters)  Note: GFR calculation is accurate only with a steady state creatinine   HS TROPONIN I 0HR - Normal    hs TnI 0hr 16     HS TROPONIN I 2HR - Normal    hs TnI 2hr 24      Delta 2hr hsTnI 8     HS TROPONIN I 4HR - Normal    hs TnI 4hr 27      Delta 4hr hsTnI 11       CT head without contrast   Final Interpretation by Timo Oh MD (09/21 1610)      No acute intracranial abnormality.                  Workstation performed: [id number]         CT spine cervical  without contrast   Final Interpretation by Timo Oh MD (09/21 1602)      No cervical spine fracture or traumatic malalignment. Small cystic lesion involving the lamina of C3 mild represent intraosseous ganglion extending from the left facet. 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      Follow-up nonemergent MRI is advised to assess for any inflammatory/erosive process at this location assuming no contraindication.         The study was marked in EPIC for immediate notification.               Workstation performed: [id number]         CT facial bones without contrast   Final Interpretation by Timo Oh MD (09/21 1610)      No evidence of facial bone fracture.               Workstation performed: [id number]         XR chest 1 view portable   Final Interpretation by Alejandra Oneill MD (09/21 0442)      Slightly increased opacity in the left base compared to August 2022 which could be due to atelectasis, scar, or aspiration.      No acute displaced fracture.            Workstation performed: [id number]             ECG 12 Lead Documentation Only    Date/Time: 9/21/2024 3:00 AM    Performed by: Loan Springer PA-C  Authorized by: Loan Springer PA-C    Patient location:  ED  Interpretation:     Interpretation: non-specific    Rate:     ECG rate:  76    ECG rate assessment: normal    Rhythm:     Rhythm: sinus rhythm    Ectopy:     Ectopy: PVCs    QRS:     QRS axis:  Normal    QRS intervals:  Normal  Conduction:     Conduction: normal        ED Medication and Procedure Management   Prior to Admission Medications   Prescriptions Last Dose Informant Patient Reported? Taking?   aspirin (ECOTRIN LOW STRENGTH) 81 mg EC tablet   Yes No   Sig: Take 81 mg by mouth   dicyclomine (BENTYL) 20 mg tablet   No No   Sig: Take 1 tablet (20 mg total) by mouth 2 (two) times a day   famotidine (PEPCID) 20 mg tablet   No No   Sig: Take 1 tablet (20 mg total) by mouth 2 (two) times a day   sertraline (ZOLOFT) 25 mg tablet   Yes  No      Facility-Administered Medications: None     Discharge Medication List as of 9/21/2024  7:19 PM        CONTINUE these medications which have NOT CHANGED    Details   aspirin (ECOTRIN LOW STRENGTH) 81 mg EC tablet Take 81 mg by mouth, Historical Med      dicyclomine (BENTYL) 20 mg tablet Take 1 tablet (20 mg total) by mouth 2 (two) times a day, Starting Mon 6/1/2020, Print      famotidine (PEPCID) 20 mg tablet Take 1 tablet (20 mg total) by mouth 2 (two) times a day, Starting Mon 6/1/2020, Print      sertraline (ZOLOFT) 25 mg tablet Starting Tue 8/2/2022, Historical Med           No discharge procedures on file.     Loan Springer PA-C  09/21/24 1954

## 2024-09-21 NOTE — DISCHARGE INSTRUCTIONS
Please follow-up with your family doctor next week.  Please return to the emergency department for new or worsening symptoms.  You can apply ice to the bruising of the face.  You can also take Tylenol as needed.  Additionally follow-up with your family doctor regarding the CT results that we discussed for follow-up MRI.  Please return with new or worsening symptoms.

## 2024-09-23 LAB
ATRIAL RATE: 76 BPM
P AXIS: 100 DEGREES
PR INTERVAL: 154 MS
QRS AXIS: 132 DEGREES
QRSD INTERVAL: 66 MS
QT INTERVAL: 372 MS
QTC INTERVAL: 418 MS
T WAVE AXIS: 111 DEGREES
VENTRICULAR RATE: 76 BPM

## 2024-09-23 PROCEDURE — 93010 ELECTROCARDIOGRAM REPORT: CPT | Performed by: INTERNAL MEDICINE
